# Patient Record
Sex: MALE | Race: WHITE
[De-identification: names, ages, dates, MRNs, and addresses within clinical notes are randomized per-mention and may not be internally consistent; named-entity substitution may affect disease eponyms.]

---

## 2018-06-21 ENCOUNTER — HOSPITAL ENCOUNTER (EMERGENCY)
Dept: HOSPITAL 14 - H.ER | Age: 38
Discharge: HOME | End: 2018-06-21
Payer: SELF-PAY

## 2018-06-21 VITALS — DIASTOLIC BLOOD PRESSURE: 75 MMHG | HEART RATE: 70 BPM | SYSTOLIC BLOOD PRESSURE: 127 MMHG

## 2018-06-21 VITALS — OXYGEN SATURATION: 99 % | RESPIRATION RATE: 16 BRPM | TEMPERATURE: 98.1 F

## 2018-06-21 DIAGNOSIS — K29.70: Primary | ICD-10-CM

## 2018-06-21 LAB
ALBUMIN SERPL-MCNC: 3.9 G/DL (ref 3.5–5)
ALBUMIN/GLOB SERPL: 1.2 {RATIO} (ref 1–2.1)
ALT SERPL-CCNC: 36 U/L (ref 21–72)
APTT BLD: 31.2 SECONDS (ref 25.6–37.1)
AST SERPL-CCNC: 25 U/L (ref 17–59)
BASOPHILS # BLD AUTO: 0.1 K/UL (ref 0–0.2)
BASOPHILS NFR BLD: 1 % (ref 0–2)
BILIRUB UR-MCNC: NEGATIVE MG/DL
BUN SERPL-MCNC: 14 MG/DL (ref 9–20)
CALCIUM SERPL-MCNC: 8.5 MG/DL (ref 8.4–10.2)
COLOR UR: YELLOW
EOSINOPHIL # BLD AUTO: 0.1 K/UL (ref 0–0.7)
EOSINOPHIL NFR BLD: 1.5 % (ref 0–4)
ERYTHROCYTE [DISTWIDTH] IN BLOOD BY AUTOMATED COUNT: 12.7 % (ref 11.5–14.5)
GFR NON-AFRICAN AMERICAN: > 60
GLUCOSE UR STRIP-MCNC: (no result) MG/DL
HGB BLD-MCNC: 15 G/DL (ref 12–18)
INR PPP: 1 (ref 0.9–1.2)
LEUKOCYTE ESTERASE UR-ACNC: (no result) LEU/UL
LIPASE SERPL-CCNC: 90 U/L (ref 23–300)
LYMPHOCYTES # BLD AUTO: 2.2 K/UL (ref 1–4.3)
LYMPHOCYTES NFR BLD AUTO: 31 % (ref 20–40)
MCH RBC QN AUTO: 30.5 PG (ref 27–31)
MCHC RBC AUTO-ENTMCNC: 34 G/DL (ref 33–37)
MCV RBC AUTO: 89.7 FL (ref 80–94)
MONOCYTES # BLD: 0.4 K/UL (ref 0–0.8)
MONOCYTES NFR BLD: 5.8 % (ref 0–10)
NEUTROPHILS # BLD: 4.3 K/UL (ref 1.8–7)
NEUTROPHILS NFR BLD AUTO: 60.7 % (ref 50–75)
NRBC BLD AUTO-RTO: 0.1 % (ref 0–0)
PH UR STRIP: 5 [PH] (ref 5–8)
PLATELET # BLD: 250 K/UL (ref 130–400)
PMV BLD AUTO: 7.9 FL (ref 7.2–11.7)
PROT UR STRIP-MCNC: NEGATIVE MG/DL
PROTHROMBIN TIME: 10.5 SECONDS (ref 9.8–13.1)
RBC # BLD AUTO: 4.93 MIL/UL (ref 4.4–5.9)
RBC # UR STRIP: (no result) /UL
SP GR UR STRIP: 1.02 (ref 1–1.03)
URINE CLARITY: CLEAR
UROBILINOGEN UR-MCNC: (no result) MG/DL (ref 0.2–1)
WBC # BLD AUTO: 7.1 K/UL (ref 4.8–10.8)

## 2018-06-21 PROCEDURE — 83690 ASSAY OF LIPASE: CPT

## 2018-06-21 PROCEDURE — 81003 URINALYSIS AUTO W/O SCOPE: CPT

## 2018-06-21 PROCEDURE — 85730 THROMBOPLASTIN TIME PARTIAL: CPT

## 2018-06-21 PROCEDURE — 74177 CT ABD & PELVIS W/CONTRAST: CPT

## 2018-06-21 PROCEDURE — 85025 COMPLETE CBC W/AUTO DIFF WBC: CPT

## 2018-06-21 PROCEDURE — 85610 PROTHROMBIN TIME: CPT

## 2018-06-21 PROCEDURE — 80053 COMPREHEN METABOLIC PANEL: CPT

## 2018-06-21 PROCEDURE — 99283 EMERGENCY DEPT VISIT LOW MDM: CPT

## 2018-06-21 NOTE — ED PDOC
HPI: Abdomen


Time Seen by Provider: 06/21/18 18:02


Chief Complaint (Nursing): Abdominal Pain


Chief Complaint (Provider): Abdominal Pain


History Per: Patient


History/Exam Limitations: no limitations


Onset/Duration Of Symptoms: Days (x2)


Current Symptoms Are (Timing): Still Present


Location Of Pain/Discomfort: Diffuse


Quality Of Discomfort: Unable To Describe


Associated Symptoms: Nausea.  denies: Vomiting, Diarrhea, Constipation


Additional Complaint(s): 


38 year old male with no medical problems presents to the ED complaining of 

abdominal pain associated with nausea, onset two days ago. Patient feels like 

his abdomen is "getting bigger". Denies vomiting, fever, constipation, and 

diarrhea. 





PMD: None Provided








Past Medical History


Reviewed: Historical Data, Nursing Documentation, Vital Signs


Vital Signs: 


 Last Vital Signs











Temp  98.1 F   06/21/18 17:53


 


Pulse  70   06/21/18 21:19


 


Resp  16   06/21/18 21:19


 


BP  127/75   06/21/18 21:19


 


Pulse Ox  99   06/21/18 21:19














- Medical History


PMH: No Chronic Diseases





- Surgical History


Surgical History: No Surg Hx





- Family History


Family History: States: Unknown Family Hx





- Social History


Current smoker - smoking cessation education provided: No


Alcohol: None





- Home Medications


Home Medications: 


 Ambulatory Orders











 Medication  Instructions  Recorded


 


traMADol [Ultram] 50 mg PO TID PRN #12 tab 06/23/16


 


Famotidine [Pepcid] 20 mg PO BID #20 tab 06/21/18


 


Ondansetron ODT [Zofran ODT] 4 mg PO Q8H PRN #20 odt 06/21/18














- Allergies


Allergies/Adverse Reactions: 


 Allergies











Allergy/AdvReac Type Severity Reaction Status Date / Time


 


No Known Allergies Allergy   Verified 06/23/16 15:19














Review of Systems


ROS Statement: Except As Marked, All Systems Reviewed And Found Negative


Constitutional: Negative for: Fever


Gastrointestinal: Positive for: Nausea, Abdominal Pain.  Negative for: Vomiting

, Diarrhea, Constipation





Physical Exam





- Reviewed


Nursing Documentation Reviewed: Yes


Vital Signs Reviewed: Yes





- Physical Exam


Appears: Positive for: No Acute Distress


Head Exam: Positive for: ATRAUMATIC, NORMOCEPHALIC


Skin: Positive for: Normal Color, Warm, Dry


Eye Exam: Positive for: Normal appearance, EOMI, PERRL


ENT: Positive for: Normal ENT Inspection


Neck: Positive for: Normal


Cardiovascular/Chest: Positive for: Regular Rate, Rhythm.  Negative for: Murmur


Respiratory: Positive for: Normal Breath Sounds.  Negative for: Respiratory 

Distress


Gastrointestinal/Abdominal: Positive for: Normal Exam, Soft, Tenderness (

generalized abdominal tenderness, most in epigastrium ).  Negative for: Guarding

, Rebound


Back: Positive for: Normal Inspection


Extremity: Positive for: Normal ROM


Neurologic/Psych: Positive for: Alert, Oriented.  Negative for: Motor/Sensory 

Deficits





- Laboratory Results


Result Diagrams: 


 06/21/18 18:15





 06/21/18 18:15





- ECG


O2 Sat by Pulse Oximetry: 99 (RA)


Pulse Ox Interpretation: Normal





Medical Decision Making


Medical Decision Making: 


Time: 1810


Impression: Abdominal Pain 


Differentials include but not limited to Gastritis, Colitis, Appendicitis 


Plan:


-- CT Abd & Pelvis IV Contrast


-- CMP


-- Lipase


-- ED Urine Dipstick 


-- CBC with differentials 


-- PTT 


-- Prothromin Time


-- Morphine 2 mg IV 


-- Sodium Chloride IV 1000 mls/hr


-- Zofran ODT 4 mg PO


-- Urinalysis 





Time: 2013


CT ABD/PELVIS RESULTS


FINDINGS:


Lung bases: Subpleural reticular opacities within the dependent aspect of the 

lower lobes may


represent subsegmental atelectasis or scarring.


ABDOMEN:


Liver: The liver is normal in appearance.


Gallbladder and bile ducts: The gallbladder is normal. No calcified stones. No 

ductal dilation.


Pancreas: The pancreas is normal. No ductal dilation.


Spleen: The spleen is normal.


Adrenals: The adrenal glands are normal.


Kidneys and ureters: The kidneys are normal. The ureters are normal. No 

hydronephrosis.


Stomach and bowel: There is a moderate amount of stool present within the 

colon. Moderate


amount of of ingested material is present within the stomach. Fluid filled 

nondilated proximal small


bowel. No mucosal thickening.


PELVIS:


Appendix: A normal appendix is identified.


Bladder: The bladder is decompressed but otherwise normal.


Reproductive: The prostate gland and seminal vesicles are normal.


ABDOMEN and PELVIS:


Intraperitoneal space: Normal. No free air. No significant fluid collection.


Bones/joints: The osseous structures are normal for age. No acute osseous 

abnormality. No


dislocation.


Soft tissues: Mild left gynecomastia.


Vasculature: Normal. No abdominal aortic aneurysm.


Lymph nodes: Normal. No enlarged lymph nodes.


IMPRESSION:


1. Moderate amount of ingested material within the stomach with diffusely fluid-

filled nondilated


proximal small bowel. These findings may be postprandial, but can also be seen 

in this


gastroenteritis.


2. No other acute abdominopelvic abnormality.


Thank you for allowing us to participate in the care of your patient.


Dictated and Authenticated by: Tai Mena DO


06/21/2018 8:13 PM Eastern Time ([place] & [place])








______________________________________________________________


Scribe Attestation:


Documented by Luis Alberto Grove, acting as a scribe for Dr. Ciara Hayes MD.





Provider Scribe Attestation:


All medical record entries made by the Scribe were at my direction and 

personally dictated by me. I have reviewed the chart and agree that the record 

accurately reflects my personal performance of the history, physical exam, 

medical decision making, and the department course for this patient. I have 

also personally directed, reviewed, and agree with the discharge instructions 

and disposition.





Disposition





- Clinical Impression


Clinical Impression: 


 Gastritis








- Disposition


Referrals: 


[facility] [Outside]


Disposition: Routine/Home


Disposition Time: 20:59


Condition: IMPROVED


Prescriptions: 


Famotidine [Pepcid] 20 mg PO BID #20 tab


Ondansetron ODT [Zofran ODT] 4 mg PO Q8H PRN #20 odt


 PRN Reason: Nausea/Vomiting


Instructions:  Gastritis


Forms:  [facility] (Yoruba)


Print Language: Tajik

## 2018-06-22 NOTE — CT
PROCEDURE:  CT Abdomen and Pelvis with contrast



HISTORY:

Gen abd pain, >epigastrium



COMPARISON:

None.



TECHNIQUE:

Following the intravenous administration of iodinated contrast 

material, a CT examination of the abdomen and pelvis performed from 

the domes of the diaphragms to the symphysis pubis with reformatted 

datasets provided not only axial but also sagittal and coronal 

planes. Oral contrast was not administered as per referring physician 

request.



Contrast dose: Omnipaque 300, 90 cc



Radiation dose:



Total exam DLP = 333.18 mGy-cm.



This CT exam was performed using one or more of the following dose 

reduction techniques: Automated exposure control, adjustment of the 

mA and/or kV according to patient size, and/or use of iterative 

reconstruction technique.



FINDINGS:



LOWER THORAX:

Unremarkable. 



LIVER:

Unremarkable. No gross lesion or ductal dilatation. 



GALLBLADDER AND BILE DUCTS:

Unremarkable. 



PANCREAS:

Unremarkable. No gross lesion or ductal dilatation.



SPLEEN:

Unremarkable. 



ADRENALS:

Unremarkable. No mass. 



KIDNEYS AND URETERS:

Unremarkable. No hydronephrosis. No solid mass. 



VASCULATURE:

Unremarkable. No aortic aneurysm. 



BOWEL:

The stomach is distended with retained fluid and food hepatic could 

reflect gas at [place] obstruction or gastroparesis. There is no 

evidence of bowel obstruction at this time grossly. Borderline 

proximal small bowel mural thickening may indicate proximal segmental 

enteritis or gastroenteritis though this is not definite. Remaining 

bowel is unremarkable as imaged. 



APPENDIX:

Normal appendix. 



PERITONEUM:

Unremarkable. No free fluid. No free air. 



LYMPH NODES:

Unremarkable. No enlarged lymph nodes. 



BLADDER:

Unremarkable. 



REPRODUCTIVE:

Unremarkable. 



BONES:

No acute fracture. 



OTHER FINDINGS:

None.



IMPRESSION:

Postprandial versus gastroenteritis related dilatation of the stomach 

and proximal small bowel with questionable proximal small bowel 

thickening. The remainder the examination appears unremarkable. 

Gastroparesis or gastric outlet obstruction not completely excluded. 



Concordant preliminary report from [facility], 06/21/2018.